# Patient Record
Sex: FEMALE
[De-identification: names, ages, dates, MRNs, and addresses within clinical notes are randomized per-mention and may not be internally consistent; named-entity substitution may affect disease eponyms.]

---

## 2021-03-29 ENCOUNTER — APPOINTMENT (OUTPATIENT)
Dept: DISASTER EMERGENCY | Facility: OTHER | Age: 61
End: 2021-03-29
Payer: COMMERCIAL

## 2021-03-29 PROCEDURE — 0011A: CPT

## 2021-04-26 ENCOUNTER — APPOINTMENT (OUTPATIENT)
Dept: DISASTER EMERGENCY | Facility: OTHER | Age: 61
End: 2021-04-26

## 2023-04-07 PROBLEM — Z00.00 ENCOUNTER FOR PREVENTIVE HEALTH EXAMINATION: Status: ACTIVE | Noted: 2023-04-07

## 2024-09-05 ENCOUNTER — APPOINTMENT (OUTPATIENT)
Dept: FAMILY MEDICINE | Facility: CLINIC | Age: 64
End: 2024-09-05

## 2024-09-06 ENCOUNTER — NON-APPOINTMENT (OUTPATIENT)
Age: 64
End: 2024-09-06

## 2024-09-06 ENCOUNTER — APPOINTMENT (OUTPATIENT)
Dept: INTERNAL MEDICINE | Facility: CLINIC | Age: 64
End: 2024-09-06

## 2024-09-06 VITALS
HEART RATE: 79 BPM | DIASTOLIC BLOOD PRESSURE: 132 MMHG | SYSTOLIC BLOOD PRESSURE: 193 MMHG | BODY MASS INDEX: 26.82 KG/M2 | OXYGEN SATURATION: 98 % | WEIGHT: 161 LBS | HEIGHT: 65 IN

## 2024-09-06 VITALS — SYSTOLIC BLOOD PRESSURE: 178 MMHG | DIASTOLIC BLOOD PRESSURE: 110 MMHG

## 2024-09-06 DIAGNOSIS — Z80.0 FAMILY HISTORY OF MALIGNANT NEOPLASM OF DIGESTIVE ORGANS: ICD-10-CM

## 2024-09-06 DIAGNOSIS — Z00.00 ENCOUNTER FOR GENERAL ADULT MEDICAL EXAMINATION W/OUT ABNORMAL FINDINGS: ICD-10-CM

## 2024-09-06 DIAGNOSIS — Z83.511 FAMILY HISTORY OF GLAUCOMA: ICD-10-CM

## 2024-09-06 DIAGNOSIS — F90.9 ATTENTION-DEFICIT HYPERACTIVITY DISORDER, UNSPECIFIED TYPE: ICD-10-CM

## 2024-09-06 DIAGNOSIS — Z13.820 ENCOUNTER FOR SCREENING FOR OSTEOPOROSIS: ICD-10-CM

## 2024-09-06 DIAGNOSIS — Z83.3 FAMILY HISTORY OF DIABETES MELLITUS: ICD-10-CM

## 2024-09-06 DIAGNOSIS — Z12.11 ENCOUNTER FOR SCREENING FOR MALIGNANT NEOPLASM OF COLON: ICD-10-CM

## 2024-09-06 DIAGNOSIS — Z82.49 FAMILY HISTORY OF ISCHEMIC HEART DISEASE AND OTHER DISEASES OF THE CIRCULATORY SYSTEM: ICD-10-CM

## 2024-09-06 DIAGNOSIS — F41.9 ANXIETY DISORDER, UNSPECIFIED: ICD-10-CM

## 2024-09-06 DIAGNOSIS — G47.00 INSOMNIA, UNSPECIFIED: ICD-10-CM

## 2024-09-06 DIAGNOSIS — Z80.3 FAMILY HISTORY OF MALIGNANT NEOPLASM OF BREAST: ICD-10-CM

## 2024-09-06 DIAGNOSIS — Z12.39 ENCOUNTER FOR OTHER SCREENING FOR MALIGNANT NEOPLASM OF BREAST: ICD-10-CM

## 2024-09-06 PROCEDURE — 99386 PREV VISIT NEW AGE 40-64: CPT

## 2024-09-06 PROCEDURE — G2211 COMPLEX E/M VISIT ADD ON: CPT | Mod: NC

## 2024-09-06 PROCEDURE — 93000 ELECTROCARDIOGRAM COMPLETE: CPT | Mod: 59

## 2024-09-06 PROCEDURE — G0444 DEPRESSION SCREEN ANNUAL: CPT | Mod: 59

## 2024-09-06 RX ORDER — AMLODIPINE BESYLATE 5 MG/1
5 TABLET ORAL
Qty: 90 | Refills: 0 | Status: ACTIVE | COMMUNITY
Start: 2024-09-06 | End: 1900-01-01

## 2024-09-06 RX ORDER — ALPRAZOLAM 0.5 MG/1
0.5 TABLET ORAL 3 TIMES DAILY
Refills: 0 | Status: ACTIVE | COMMUNITY
Start: 2024-09-06

## 2024-09-06 RX ORDER — DEXTROAMPHETAMINE SACCHARATE, AMPHETAMINE ASPARTATE, DEXTROAMPHETAMINE SULFATE AND AMPHETAMINE SULFATE 3.75; 3.75; 3.75; 3.75 MG/1; MG/1; MG/1; MG/1
15 TABLET ORAL
Refills: 0 | Status: ACTIVE | COMMUNITY
Start: 2024-09-06

## 2024-09-06 RX ORDER — ZOLPIDEM TARTRATE 10 MG/1
10 TABLET ORAL
Qty: 14 | Refills: 0 | Status: ACTIVE | COMMUNITY
Start: 2024-09-06

## 2024-09-06 NOTE — PHYSICAL EXAM
[No Acute Distress] : no acute distress [Well Nourished] : well nourished [Well Developed] : well developed [Well-Appearing] : well-appearing [Normal Sclera/Conjunctiva] : normal sclera/conjunctiva [PERRL] : pupils equal round and reactive to light [EOMI] : extraocular movements intact [Normal Outer Ear/Nose] : the outer ears and nose were normal in appearance [Normal Oropharynx] : the oropharynx was normal [No JVD] : no jugular venous distention [No Lymphadenopathy] : no lymphadenopathy [Supple] : supple [Thyroid Normal, No Nodules] : the thyroid was normal and there were no nodules present [No Respiratory Distress] : no respiratory distress  [No Accessory Muscle Use] : no accessory muscle use [Clear to Auscultation] : lungs were clear to auscultation bilaterally [Normal Rate] : normal rate  [Regular Rhythm] : with a regular rhythm [Normal S1, S2] : normal S1 and S2 [No Murmur] : no murmur heard [No Carotid Bruits] : no carotid bruits [No Abdominal Bruit] : a ~M bruit was not heard ~T in the abdomen [No Varicosities] : no varicosities [Pedal Pulses Present] : the pedal pulses are present [No Edema] : there was no peripheral edema [No Palpable Aorta] : no palpable aorta [No Extremity Clubbing/Cyanosis] : no extremity clubbing/cyanosis [Soft] : abdomen soft [Non Tender] : non-tender [Non-distended] : non-distended [No Masses] : no abdominal mass palpated [No HSM] : no HSM [Normal Bowel Sounds] : normal bowel sounds [Normal Posterior Cervical Nodes] : no posterior cervical lymphadenopathy [Normal Anterior Cervical Nodes] : no anterior cervical lymphadenopathy [No CVA Tenderness] : no CVA  tenderness [No Spinal Tenderness] : no spinal tenderness [No Joint Swelling] : no joint swelling [Grossly Normal Strength/Tone] : grossly normal strength/tone [No Rash] : no rash [Coordination Grossly Intact] : coordination grossly intact [No Focal Deficits] : no focal deficits [Normal Gait] : normal gait [Deep Tendon Reflexes (DTR)] : deep tendon reflexes were 2+ and symmetric [Normal Affect] : the affect was normal [Normal Insight/Judgement] : insight and judgment were intact [de-identified] : Patient hyperverbal, tangential, and crying throughout visit

## 2024-09-06 NOTE — PLAN
[FreeTextEntry1] : Hypertension: - The patient's blood pressure is observed to be significantly high. Patient asymptomatic. Strongly urged patient to go to the ER for urgent BP control. Patient refusing to go to the ER. Educated patient on risks of not going to the ER for immediate blood pressure control including heart attack, stroke, kidney failure, and death. Patient verbalized understanding of risks and still is refusing to go to the ER. - Therapeutic Interventions: Prescription of Amlodipine 5mg prescribed to help control hypertension. Side effects and administration instructions reviewed with patient. Patient's BP likely significantly elevated at baseline. Aim for slow down-titration of BP to avoid complications.  - Patient also refusing outpatient cardiology referral. Patient states she does not like to go to doctors. - Diagnostic Tests: Follow-up appointment early next week to check BP levels. - Patient to have labs drawn to assess kidney function - EKG without ischemic changes - Counseled about stress management.  Anxiety and Depression, ADHD: - PHQ-9 score = 7, mild depression with no SI - The patient has been on medication and in therapy for significant stress and depressive symptoms. She expresses feelings of sadness and isolation - Patient hyperverbal and tangential with frequent crying throughout the visit - Therapeutic Interventions: Continue the current regimen of Xanax, Adderall, and Ambien as prescribed by her psychiatrist. Encourage utilization of supportive resources and coping mechanisms. Recommend close follow up for psychiatric stability.  - Patient Education: Advise on the significance of therapy and medication adherence for managing her clinical depression. Encourage leaning on social support systems. - Follow-Up: Mental health assessment at each visit based on patient's depressive symptomatology. Continued monitoring by her psychiatrist.  Family History of Colon and Breast Cancers: - Family history suggests a predisposition to colon and breast cancers. A colonoscopy is recommended. Patient reports being up to date on mammo (2024, normal results as per patient). Will try to obtain records as patient reports she had it done at [facility]. - Diagnostic Tests: Colonoscopy due to personal history of polyps and strong family history of colon cancer. Also recommend routine mammogram for routine breast cancer screening. - Patient Education: Explained to the patient her increased risk and the importance of early detection. - GI referral placed today for colonoscopy  HCM: - Patient did not fast today. Script provided to patient to have fasting blood work done at outside lab.  - EKG NSR without ischemic changes - Next mammo- 2025 - DEXA script provided to patient - GYN referral placed for pap smear - Next eye exam- 2025 - Advised patient to schedule derm appt for skin cancer screening.  f/u early next week for BP check

## 2024-09-06 NOTE — HEALTH RISK ASSESSMENT
[2 - 3 times a week (3 pts)] : 2 - 3  times a week (3 points) [1 or 2 (0 pts)] : 1 or 2 (0 points) [Less than monthly (1 pt)] : Less than monthly (1 point) [No falls in past year] : Patient reported no falls in the past year [2] : 2) Feeling down, depressed, or hopeless for more than half of the days (2) [PHQ-2 Positive] : PHQ-2 Positive [Yes] : In the past 12 months have you used drugs other than those required for medical reasons? Yes [1/2 of Days or More (2)] : 2.) Feeling down, depressed or hopeless? Half the days or more [Nearly Every Day (3)] : 6.) Feeling bad about yourself, or that you are a failure, or have let yourself or your family down? Nearly every day [Not at All (0)] : 9.) Thoughts that you would be off dead or of hurting yourself in some way? Not at all [Somewhat Difficult] : How difficult have these problems made it for you to do your work, take care of things at home, or get along with people? Somewhat difficult [PHQ-9 Positive] : PHQ-9 Positive [I have developed a follow-up plan documented below in the note.] : I have developed a follow-up plan documented below in the note. [Time Spent: ___ Minutes] : I spent [unfilled] minutes performing a depression screening for this patient. [Never] : Never [No Retinopathy] : No retinopathy [Patient reported mammogram was normal] : Patient reported mammogram was normal [Patient reported PAP Smear was normal] : Patient reported PAP Smear was normal [Patient reported colonoscopy was abnormal] : Patient reported colonoscopy was abnormal [HIV test declined] : HIV test declined [Alone] : lives alone [Employed] : employed [] :  [Sexually Active] : sexually active [Feels Safe at Home] : Feels safe at home [Fully functional (bathing, dressing, toileting, transferring, walking, feeding)] : Fully functional (bathing, dressing, toileting, transferring, walking, feeding) [Fully functional (using the telephone, shopping, preparing meals, housekeeping, doing laundry, using] : Fully functional and needs no help or supervision to perform IADLs (using the telephone, shopping, preparing meals, housekeeping, doing laundry, using transportation, managing medications and managing finances) [Reports normal functional visual acuity (ie: able to read med bottle)] : Reports normal functional visual acuity [Smoke Detector] : smoke detector [Carbon Monoxide Detector] : carbon monoxide detector [Safety elements used in home] : safety elements used in home [Seat Belt] :  uses seat belt [Sunscreen] : uses sunscreen [NO] : No [Fair] :  ~his/her~ mood as fair [Hepatitis C test offered] : Hepatitis C test offered [Audit-CScore] : 4 [de-identified] : marijuana [de-identified] : walks, wants to go back to the gym [de-identified] : tries to eat well, tries to avoid foods that are bad [[id number]] : 4 [[id number]] : 7 [EyeExamDate] : 2023 [High Risk Behavior] : no high risk behavior [Reports changes in hearing] : Reports no changes in hearing [Reports changes in vision] : Reports no changes in vision [Reports changes in dental health] : Reports no changes in dental health [Travel to Developing Areas] : does not  travel to developing areas [TB Exposure] : is not being exposed to tuberculosis [Caregiver Concerns] : does not have caregiver concerns [MammogramDate] : 2024 [PapSmearDate] : 2019 [BoneDensityDate] : never [ColonoscopyDate] : 2018 [ColonoscopyComments] : jenna [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

## 2024-09-06 NOTE — HISTORY OF PRESENT ILLNESS
[FreeTextEntry1] : CPE [de-identified] : Patient is a 64-year-old female presenting for a CPE. Patient reports she has not been to a doctor in many years. She saw rheumatologist in the past for right ankle swelling, workup was unremarkable. Right ankle swelling improves with compression. Patient feels the swelling is associated with alcohol use. Patient reports she has a hx HTN. Patient states she doesn't go to doctors because she would rather not know about the health problems she has. She feels her HTN is due to anxiety and stress. Patient reports she lost all of her friends due to political differences. She also endorses stress due to family conflict with daughter-in-law. She denies HA, blurred vision, chest pain. Patient has not seen cardiologist. She endorses a family hx colon cancer (mother). Patient's last colonoscopy was in 2018 and had polyps, has not followed up. She has not seen a GYN in many years. She follows with psychiatrist and therapist regularly, last saw therapist yesterday. Patient endorses a hx SI many years ago, but states she has no active SI/thoughts of hurting herself.  Last mammo- 2024 (normal, [facility] radi[facility]) Last DEXA- never Last pap- 2019 (negative) Last colonoscopy- 2018 (polyps, [facility]) Last eye exam- 2024 (prescription change) Last skin cancer screening- 2023 (one suspicious lesion frozen off)

## 2024-09-11 ENCOUNTER — APPOINTMENT (OUTPATIENT)
Dept: INTERNAL MEDICINE | Facility: CLINIC | Age: 64
End: 2024-09-11
Payer: MEDICAID

## 2024-09-11 VITALS
OXYGEN SATURATION: 98 % | HEART RATE: 90 BPM | DIASTOLIC BLOOD PRESSURE: 110 MMHG | HEIGHT: 65 IN | WEIGHT: 165 LBS | BODY MASS INDEX: 27.49 KG/M2 | SYSTOLIC BLOOD PRESSURE: 175 MMHG

## 2024-09-11 PROBLEM — I10 HYPERTENSION: Status: ACTIVE | Noted: 2024-09-06

## 2024-09-11 PROCEDURE — G2211 COMPLEX E/M VISIT ADD ON: CPT | Mod: NC

## 2024-09-11 PROCEDURE — 99212 OFFICE O/P EST SF 10 MIN: CPT

## 2024-09-11 RX ORDER — AMLODIPINE BESYLATE 2.5 MG/1
2.5 TABLET ORAL DAILY
Qty: 1 | Refills: 0 | Status: ACTIVE | COMMUNITY
Start: 2024-09-11 | End: 1900-01-01

## 2024-09-11 NOTE — PLAN
[FreeTextEntry1] : Uncontrolled hypertension: - Patient presented today with uncontrolled hypertension, despite adherence to her current medication, amlodipine 5mg. BP is slightly improved from last visit after addition of amlodipine 5mg. The plan is to increase the dosage of amlodipine to control her elevated blood pressure. - Therapeutic Interventions : Increase amlodipine dose to 7.5mg daily, combining a 5mg pill and a 2.5mg pill. Monitor for potential adverse effects including leg/ankle swelling and headaches. Patient refuses addition of second antihypertensive medication. - Diagnostic Tests: Monitor blood pressure at home, and have it measured again after one week on the increased dosage regimen. - Referrals: Continue sessions with current therapist to manage stress and mental well-being which may be having an influence on blood pressure control. - Patient Education : Educated patient on the risks associated with uncontrolled hypertension, such as stroke, heart attack, kidney damage. Emphasized the importance of medication adherence, possible side effects, and the need for close follow-up. - Follow-Up : Schedule follow-up appointment in one week to check on progress with new medication regimen.

## 2024-09-11 NOTE — HISTORY OF PRESENT ILLNESS
[FreeTextEntry1] : BP check [de-identified] : Patient is a 64-year-old female presenting for a follow-up visit for BP check. Patient reports she has been taking the prescribed amlodipine 5mg daily. She endorses eating a low sodium diet. She does state she's under a tremendous amount of stress due to anxiety about the upcoming election. She is tearful during the visit due to the stress. She denies chest pain, blurred vision (aside from baseline myopia), and headaches. She states she will never go to the hospital and will refuse to go for the rest of her life.

## 2024-09-18 ENCOUNTER — APPOINTMENT (OUTPATIENT)
Dept: INTERNAL MEDICINE | Facility: CLINIC | Age: 64
End: 2024-09-18
Payer: MEDICAID

## 2024-09-18 VITALS
DIASTOLIC BLOOD PRESSURE: 92 MMHG | HEIGHT: 65 IN | SYSTOLIC BLOOD PRESSURE: 152 MMHG | WEIGHT: 163 LBS | BODY MASS INDEX: 27.16 KG/M2

## 2024-09-18 DIAGNOSIS — I10 ESSENTIAL (PRIMARY) HYPERTENSION: ICD-10-CM

## 2024-09-18 PROCEDURE — 99212 OFFICE O/P EST SF 10 MIN: CPT

## 2024-09-18 PROCEDURE — G2211 COMPLEX E/M VISIT ADD ON: CPT | Mod: NC

## 2024-09-18 NOTE — HISTORY OF PRESENT ILLNESS
[FreeTextEntry1] : RPA for BP check [de-identified] : Patient is a 64-year-old female presenting for a follow-up visit for BP check. Patient reports she has been taking the prescribed amlodipine 7.5mg daily. She endorses eating a low sodium diet. She states she does not want any additional medication and states she believes eating more tofu will lower her BP. She denies chest pain, blurred vision (aside from baseline myopia), and headaches. She endorses checking BP at local grocery store and it was around 150/90.

## 2024-09-18 NOTE — PLAN
[FreeTextEntry1] : Uncontrolled hypertension: - Patient presented today with slightly more controlled hypertension. She reports adherence to her current medication, amlodipine 7.5mg. BP is slightly improved from last visit after addition of amlodipine 2.5mg in addition to amlodipine 5mg. - Recommended addition second antihypertensive agent. Patient refused saying she does not want to take additional medication. Then recommended increasing amlodipine to 10mg daily and patient also refused. - Diagnostic Tests: Monitor blood pressure at home - Referrals: Continue sessions with current therapist to manage stress and mental well-being which may be having an influence on blood pressure control. - Patient Education : Educated patient on the risks associated with uncontrolled hypertension, such as stroke, heart attack, kidney damage. Emphasized the importance of medication adherence, possible side effects, and the need for close follow-up. - Follow-Up : Schedule follow-up appointment in 2 weeks to check on progress with new medication regimen. - Recommend patient get ordered blood work done at lab to check kidney function given longstanding uncontrolled hypertension. Patient was not fasting today, unable to draw labs in office.  f/u 2 weeks

## 2024-10-02 ENCOUNTER — APPOINTMENT (OUTPATIENT)
Dept: INTERNAL MEDICINE | Facility: CLINIC | Age: 64
End: 2024-10-02
Payer: MEDICAID

## 2024-10-02 ENCOUNTER — NON-APPOINTMENT (OUTPATIENT)
Age: 64
End: 2024-10-02

## 2024-10-02 VITALS
DIASTOLIC BLOOD PRESSURE: 90 MMHG | SYSTOLIC BLOOD PRESSURE: 150 MMHG | HEIGHT: 65 IN | HEART RATE: 87 BPM | WEIGHT: 165 LBS | BODY MASS INDEX: 27.49 KG/M2 | OXYGEN SATURATION: 95 %

## 2024-10-02 DIAGNOSIS — I10 ESSENTIAL (PRIMARY) HYPERTENSION: ICD-10-CM

## 2024-10-02 DIAGNOSIS — I49.9 CARDIAC ARRHYTHMIA, UNSPECIFIED: ICD-10-CM

## 2024-10-02 DIAGNOSIS — R94.31 ABNORMAL ELECTROCARDIOGRAM [ECG] [EKG]: ICD-10-CM

## 2024-10-02 PROCEDURE — 99214 OFFICE O/P EST MOD 30 MIN: CPT

## 2024-10-02 PROCEDURE — 93000 ELECTROCARDIOGRAM COMPLETE: CPT

## 2024-10-02 PROCEDURE — G2211 COMPLEX E/M VISIT ADD ON: CPT | Mod: NC

## 2024-10-02 NOTE — HISTORY OF PRESENT ILLNESS
[FreeTextEntry1] : 2 week f/u [de-identified] : Patient is a 64-year-old female with PMH HTN presenting for a follow-up BP check. Patient reports she has been taking the amlodipine 7.5mg daily at 7am. She reports she is tolerating the medication well with no side effects. She states she is slightly less stressed now as things have improved with her daughter-in-law. She still reports she thinks her BP is high due to stress from the upcoming election. She reports she does not want to change any BP medication until she sees how her BP is after the election.

## 2024-10-02 NOTE — PHYSICAL EXAM
[No Acute Distress] : no acute distress [Well Nourished] : well nourished [Well Developed] : well developed [Well-Appearing] : well-appearing [Normal Sclera/Conjunctiva] : normal sclera/conjunctiva [PERRL] : pupils equal round and reactive to light [EOMI] : extraocular movements intact [Normal Outer Ear/Nose] : the outer ears and nose were normal in appearance [Normal Oropharynx] : the oropharynx was normal [No JVD] : no jugular venous distention [No Lymphadenopathy] : no lymphadenopathy [Supple] : supple [Thyroid Normal, No Nodules] : the thyroid was normal and there were no nodules present [No Respiratory Distress] : no respiratory distress  [No Accessory Muscle Use] : no accessory muscle use [Clear to Auscultation] : lungs were clear to auscultation bilaterally [Normal Rate] : normal rate  [Normal S1, S2] : normal S1 and S2 [No Murmur] : no murmur heard [No Carotid Bruits] : no carotid bruits [No Abdominal Bruit] : a ~M bruit was not heard ~T in the abdomen [No Varicosities] : no varicosities [Pedal Pulses Present] : the pedal pulses are present [No Edema] : there was no peripheral edema [No Palpable Aorta] : no palpable aorta [No Extremity Clubbing/Cyanosis] : no extremity clubbing/cyanosis [Soft] : abdomen soft [Non Tender] : non-tender [Non-distended] : non-distended [No Masses] : no abdominal mass palpated [No HSM] : no HSM [Normal Bowel Sounds] : normal bowel sounds [Normal Posterior Cervical Nodes] : no posterior cervical lymphadenopathy [Normal Anterior Cervical Nodes] : no anterior cervical lymphadenopathy [No CVA Tenderness] : no CVA  tenderness [No Spinal Tenderness] : no spinal tenderness [No Joint Swelling] : no joint swelling [Grossly Normal Strength/Tone] : grossly normal strength/tone [No Rash] : no rash [Coordination Grossly Intact] : coordination grossly intact [No Focal Deficits] : no focal deficits [Normal Gait] : normal gait [Deep Tendon Reflexes (DTR)] : deep tendon reflexes were 2+ and symmetric [Normal Affect] : the affect was normal [Normal Insight/Judgement] : insight and judgment were intact [de-identified] : irregular rhythm

## 2024-10-02 NOTE — PLAN
[FreeTextEntry1] : HTN: - BP still above goal today - Discussed that I would like to increase her amlodipine or add a second antihypertensive medication to reduce her BP. Patient refusing increase in amlodipine dosing and addition of second antihypertensive medication. - Discussed risks associated with longstanding elevated BP including chronic kidney disease, heart attack, stroke, and death. Patient still refusing change in BP medication - Recommend patient check BP at home to assess for white coat HTN - Recommend patient return in 1 month for BP check. Will continue to educate patient that she is above goal BP, which can be associated with long-term risk for health complications.  Irregular heartbeat: - Irregular rhythm auscultated on exam today with dropped beats - EKG shows NSR with nonspecific ST depression and T-wave abnormality - No active chest pain or red flag symptoms. Patient reports feeling very well today - Patient refusing cardiology referral/to see a cardiologist. Explained to patient that changes in her EKG may mean there is cardiac ischemia/damage that has occurred. She should get an echo and stress to evaluate for wall motion abnormalities and inducible ischemia. Discussed with patient that cardiac ischemia can lead to heart attack, fatal arrhythmias, and death. Patient still refusing to see a cardiologist. - Recommend patient go to the ER immediately for chest pain, dizziness, shortness of breath, or palpitations.  HCM: - Labs collected and sent today as patient did not go to lab for blood work after her CPE

## 2024-10-03 DIAGNOSIS — Z86.2 PERSONAL HISTORY OF DISEASES OF THE BLOOD AND BLOOD-FORMING ORGANS AND CERTAIN DISORDERS INVOLVING THE IMMUNE MECHANISM: ICD-10-CM

## 2024-10-03 LAB
25(OH)D3 SERPL-MCNC: 24 NG/ML
ALBUMIN SERPL ELPH-MCNC: 4.5 G/DL
ALP BLD-CCNC: 83 U/L
ALT SERPL-CCNC: 24 U/L
ANION GAP SERPL CALC-SCNC: 14 MMOL/L
AST SERPL-CCNC: 23 U/L
BASOPHILS # BLD AUTO: 0.05 K/UL
BASOPHILS NFR BLD AUTO: 0.9 %
BILIRUB SERPL-MCNC: 0.5 MG/DL
BUN SERPL-MCNC: 13 MG/DL
CALCIUM SERPL-MCNC: 10.1 MG/DL
CHLORIDE SERPL-SCNC: 103 MMOL/L
CHOLEST SERPL-MCNC: 255 MG/DL
CO2 SERPL-SCNC: 24 MMOL/L
CREAT SERPL-MCNC: 0.7 MG/DL
EGFR: 97 ML/MIN/1.73M2
EOSINOPHIL # BLD AUTO: 0.08 K/UL
EOSINOPHIL NFR BLD AUTO: 1.5 %
ESTIMATED AVERAGE GLUCOSE: 123 MG/DL
FOLATE SERPL-MCNC: 4.7 NG/ML
GLUCOSE SERPL-MCNC: 142 MG/DL
HBA1C MFR BLD HPLC: 5.9 %
HCT VFR BLD CALC: 48.4 %
HCV AB SER QL: NONREACTIVE
HCV S/CO RATIO: 0.51 S/CO
HDLC SERPL-MCNC: 57 MG/DL
HGB BLD-MCNC: 16.4 G/DL
IMM GRANULOCYTES NFR BLD AUTO: 0.6 %
LDLC SERPL CALC-MCNC: 172 MG/DL
LYMPHOCYTES # BLD AUTO: 2.46 K/UL
LYMPHOCYTES NFR BLD AUTO: 46.2 %
MAN DIFF?: NORMAL
MCHC RBC-ENTMCNC: 31.8 PG
MCHC RBC-ENTMCNC: 33.9 GM/DL
MCV RBC AUTO: 94 FL
MONOCYTES # BLD AUTO: 0.36 K/UL
MONOCYTES NFR BLD AUTO: 6.8 %
NEUTROPHILS # BLD AUTO: 2.34 K/UL
NEUTROPHILS NFR BLD AUTO: 44 %
NONHDLC SERPL-MCNC: 198 MG/DL
PLATELET # BLD AUTO: 246 K/UL
POTASSIUM SERPL-SCNC: 4.5 MMOL/L
PROT SERPL-MCNC: 6.9 G/DL
RBC # BLD: 5.15 M/UL
RBC # FLD: 12.6 %
SODIUM SERPL-SCNC: 141 MMOL/L
TRIGL SERPL-MCNC: 144 MG/DL
TSH SERPL-ACNC: 2.29 UIU/ML
VIT B12 SERPL-MCNC: 632 PG/ML
WBC # FLD AUTO: 5.32 K/UL

## 2024-12-06 ENCOUNTER — RX RENEWAL (OUTPATIENT)
Age: 64
End: 2024-12-06

## 2024-12-26 ENCOUNTER — APPOINTMENT (OUTPATIENT)
Dept: INTERNAL MEDICINE | Facility: CLINIC | Age: 64
End: 2024-12-26
Payer: MEDICAID

## 2024-12-26 VITALS
HEART RATE: 89 BPM | DIASTOLIC BLOOD PRESSURE: 91 MMHG | BODY MASS INDEX: 27.99 KG/M2 | OXYGEN SATURATION: 98 % | WEIGHT: 168 LBS | SYSTOLIC BLOOD PRESSURE: 148 MMHG | HEIGHT: 65 IN

## 2024-12-26 DIAGNOSIS — E78.5 HYPERLIPIDEMIA, UNSPECIFIED: ICD-10-CM

## 2024-12-26 DIAGNOSIS — I10 ESSENTIAL (PRIMARY) HYPERTENSION: ICD-10-CM

## 2024-12-26 DIAGNOSIS — R94.31 ABNORMAL ELECTROCARDIOGRAM [ECG] [EKG]: ICD-10-CM

## 2024-12-26 DIAGNOSIS — D75.1 SECONDARY POLYCYTHEMIA: ICD-10-CM

## 2024-12-26 PROCEDURE — G2211 COMPLEX E/M VISIT ADD ON: CPT | Mod: NC

## 2024-12-26 PROCEDURE — 99214 OFFICE O/P EST MOD 30 MIN: CPT

## 2024-12-26 RX ORDER — AMLODIPINE BESYLATE 10 MG/1
10 TABLET ORAL
Qty: 30 | Refills: 0 | Status: ACTIVE | COMMUNITY
Start: 2024-12-26 | End: 1900-01-01

## 2025-04-24 ENCOUNTER — APPOINTMENT (OUTPATIENT)
Dept: INTERNAL MEDICINE | Facility: CLINIC | Age: 65
End: 2025-04-24
Payer: MEDICAID

## 2025-04-24 VITALS
SYSTOLIC BLOOD PRESSURE: 162 MMHG | OXYGEN SATURATION: 97 % | WEIGHT: 168 LBS | HEIGHT: 65 IN | BODY MASS INDEX: 27.99 KG/M2 | DIASTOLIC BLOOD PRESSURE: 94 MMHG | HEART RATE: 97 BPM

## 2025-04-24 DIAGNOSIS — E78.5 HYPERLIPIDEMIA, UNSPECIFIED: ICD-10-CM

## 2025-04-24 DIAGNOSIS — I10 ESSENTIAL (PRIMARY) HYPERTENSION: ICD-10-CM

## 2025-04-24 DIAGNOSIS — D75.1 SECONDARY POLYCYTHEMIA: ICD-10-CM

## 2025-04-24 PROCEDURE — G2211 COMPLEX E/M VISIT ADD ON: CPT | Mod: NC

## 2025-04-24 PROCEDURE — 99214 OFFICE O/P EST MOD 30 MIN: CPT

## 2025-05-08 ENCOUNTER — APPOINTMENT (OUTPATIENT)
Dept: INTERNAL MEDICINE | Facility: CLINIC | Age: 65
End: 2025-05-08

## 2025-05-08 ENCOUNTER — APPOINTMENT (OUTPATIENT)
Dept: INTERNAL MEDICINE | Facility: CLINIC | Age: 65
End: 2025-05-08
Payer: MEDICAID

## 2025-05-08 VITALS
BODY MASS INDEX: 28.49 KG/M2 | OXYGEN SATURATION: 98 % | HEIGHT: 65 IN | HEART RATE: 83 BPM | SYSTOLIC BLOOD PRESSURE: 154 MMHG | WEIGHT: 171 LBS | DIASTOLIC BLOOD PRESSURE: 90 MMHG

## 2025-05-08 DIAGNOSIS — I10 ESSENTIAL (PRIMARY) HYPERTENSION: ICD-10-CM

## 2025-05-08 DIAGNOSIS — F41.9 ANXIETY DISORDER, UNSPECIFIED: ICD-10-CM

## 2025-05-08 DIAGNOSIS — Z78.9 OTHER SPECIFIED HEALTH STATUS: ICD-10-CM

## 2025-05-08 DIAGNOSIS — F32.A DEPRESSION, UNSPECIFIED: ICD-10-CM

## 2025-05-08 DIAGNOSIS — R47.89 OTHER SPEECH DISTURBANCES: ICD-10-CM

## 2025-05-08 PROBLEM — Z00.00 ENCOUNTER FOR PREVENTIVE HEALTH EXAMINATION: Status: ACTIVE | Noted: 2025-05-08

## 2025-05-08 PROCEDURE — 99214 OFFICE O/P EST MOD 30 MIN: CPT

## 2025-05-08 PROCEDURE — G2211 COMPLEX E/M VISIT ADD ON: CPT | Mod: NC

## 2025-06-13 ENCOUNTER — RX RENEWAL (OUTPATIENT)
Age: 65
End: 2025-06-13